# Patient Record
Sex: FEMALE | Race: WHITE
[De-identification: names, ages, dates, MRNs, and addresses within clinical notes are randomized per-mention and may not be internally consistent; named-entity substitution may affect disease eponyms.]

---

## 2018-04-16 ENCOUNTER — CLINICAL DOCUMENTATION (OUTPATIENT)
Dept: OTHER | Facility: CLINIC | Age: 58
End: 2018-04-16

## 2023-07-26 ENCOUNTER — TELEPHONE (OUTPATIENT)
Dept: FAMILY MEDICINE CLINIC | Age: 63
End: 2023-07-26

## 2023-07-26 DIAGNOSIS — Z13.228 SCREENING FOR METABOLIC DISORDER: Primary | ICD-10-CM

## 2023-07-26 NOTE — TELEPHONE ENCOUNTER
Patient has not been seen for a couple of years, made an appt with Betty for 8/3 @ 330 pm. Patient is requesting that orders for basic labs be done so that she can have them done prior to her visit with Betty and they can be discussed then.  Please advise if this can be done

## 2023-07-27 DIAGNOSIS — Z13.228 SCREENING FOR METABOLIC DISORDER: ICD-10-CM

## 2023-07-27 LAB
ABSOLUTE IMMATURE GRANULOCYTE: <0.03 K/UL (ref 0–0.58)
ALBUMIN SERPL-MCNC: 4.5 G/DL (ref 3.5–5.2)
ALP BLD-CCNC: 79 U/L (ref 35–104)
ALT SERPL-CCNC: 13 U/L (ref 0–32)
ANION GAP SERPL CALCULATED.3IONS-SCNC: 16 MMOL/L (ref 7–16)
AST SERPL-CCNC: 17 U/L (ref 0–31)
BASOPHILS ABSOLUTE: 0.05 K/UL (ref 0–0.2)
BASOPHILS RELATIVE PERCENT: 1 % (ref 0–2)
BILIRUB SERPL-MCNC: 0.5 MG/DL (ref 0–1.2)
BUN BLDV-MCNC: 15 MG/DL (ref 6–23)
CALCIUM SERPL-MCNC: 9.5 MG/DL (ref 8.6–10.2)
CHLORIDE BLD-SCNC: 105 MMOL/L (ref 98–107)
CHOLESTEROL: 267 MG/DL
CO2: 20 MMOL/L (ref 22–29)
CREAT SERPL-MCNC: 1 MG/DL (ref 0.5–1)
EOSINOPHILS ABSOLUTE: 0.3 K/UL (ref 0.05–0.5)
EOSINOPHILS RELATIVE PERCENT: 6 % (ref 0–6)
GFR SERPL CREATININE-BSD FRML MDRD: >60 ML/MIN/1.73M2
GLUCOSE BLD-MCNC: 77 MG/DL (ref 74–99)
HCT VFR BLD CALC: 43.1 % (ref 34–48)
HDLC SERPL-MCNC: 63 MG/DL
HEMOGLOBIN: 13.4 G/DL (ref 11.5–15.5)
IMMATURE GRANULOCYTES: 0 % (ref 0–5)
LDL CHOLESTEROL: 184 MG/DL
LYMPHOCYTES ABSOLUTE: 1.94 K/UL (ref 1.5–4)
LYMPHOCYTES RELATIVE PERCENT: 39 % (ref 20–42)
MCH RBC QN AUTO: 27.6 PG (ref 26–35)
MCHC RBC AUTO-ENTMCNC: 31.1 G/DL (ref 32–34.5)
MCV RBC AUTO: 88.9 FL (ref 80–99.9)
MONOCYTES ABSOLUTE: 0.44 K/UL (ref 0.1–0.95)
MONOCYTES RELATIVE PERCENT: 9 % (ref 2–12)
NEUTROPHILS ABSOLUTE: 2.25 K/UL (ref 1.8–7.3)
NEUTROPHILS RELATIVE PERCENT: 45 % (ref 43–80)
PDW BLD-RTO: 13.5 % (ref 11.5–15)
PLATELET # BLD: 188 K/UL (ref 130–450)
PMV BLD AUTO: 9.9 FL (ref 7–12)
POTASSIUM SERPL-SCNC: 4.2 MMOL/L (ref 3.5–5)
RBC # BLD: 4.85 M/UL (ref 3.5–5.5)
SODIUM BLD-SCNC: 141 MMOL/L (ref 132–146)
TOTAL PROTEIN: 7.1 G/DL (ref 6.4–8.3)
TRIGL SERPL-MCNC: 100 MG/DL
VLDLC SERPL CALC-MCNC: 20 MG/DL
WBC # BLD: 5 K/UL (ref 4.5–11.5)

## 2023-07-28 LAB
T4 FREE: 1.4 NG/DL (ref 0.9–1.7)
TSH SERPL DL<=0.05 MIU/L-ACNC: 0.73 UIU/ML (ref 0.27–4.2)

## 2023-08-01 SDOH — HEALTH STABILITY: PHYSICAL HEALTH: ON AVERAGE, HOW MANY DAYS PER WEEK DO YOU ENGAGE IN MODERATE TO STRENUOUS EXERCISE (LIKE A BRISK WALK)?: 2 DAYS

## 2023-08-03 ENCOUNTER — OFFICE VISIT (OUTPATIENT)
Dept: FAMILY MEDICINE CLINIC | Age: 63
End: 2023-08-03
Payer: COMMERCIAL

## 2023-08-03 VITALS
DIASTOLIC BLOOD PRESSURE: 80 MMHG | BODY MASS INDEX: 33.6 KG/M2 | WEIGHT: 182.6 LBS | HEIGHT: 62 IN | TEMPERATURE: 97.5 F | SYSTOLIC BLOOD PRESSURE: 132 MMHG | OXYGEN SATURATION: 98 % | HEART RATE: 80 BPM

## 2023-08-03 DIAGNOSIS — Z78.0 POST-MENOPAUSAL: ICD-10-CM

## 2023-08-03 DIAGNOSIS — Z13.228 SCREENING FOR METABOLIC DISORDER: ICD-10-CM

## 2023-08-03 DIAGNOSIS — E78.2 MIXED HYPERLIPIDEMIA: Primary | ICD-10-CM

## 2023-08-03 DIAGNOSIS — Z12.31 ENCOUNTER FOR SCREENING MAMMOGRAM FOR MALIGNANT NEOPLASM OF BREAST: ICD-10-CM

## 2023-08-03 PROCEDURE — 1090F PRES/ABSN URINE INCON ASSESS: CPT | Performed by: NURSE PRACTITIONER

## 2023-08-03 PROCEDURE — G8427 DOCREV CUR MEDS BY ELIG CLIN: HCPCS | Performed by: NURSE PRACTITIONER

## 2023-08-03 PROCEDURE — G8417 CALC BMI ABV UP PARAM F/U: HCPCS | Performed by: NURSE PRACTITIONER

## 2023-08-03 PROCEDURE — G8400 PT W/DXA NO RESULTS DOC: HCPCS | Performed by: NURSE PRACTITIONER

## 2023-08-03 PROCEDURE — 1123F ACP DISCUSS/DSCN MKR DOCD: CPT | Performed by: NURSE PRACTITIONER

## 2023-08-03 PROCEDURE — 3017F COLORECTAL CA SCREEN DOC REV: CPT | Performed by: NURSE PRACTITIONER

## 2023-08-03 PROCEDURE — 1036F TOBACCO NON-USER: CPT | Performed by: NURSE PRACTITIONER

## 2023-08-03 PROCEDURE — 99204 OFFICE O/P NEW MOD 45 MIN: CPT | Performed by: NURSE PRACTITIONER

## 2023-08-03 SDOH — ECONOMIC STABILITY: FOOD INSECURITY: WITHIN THE PAST 12 MONTHS, YOU WORRIED THAT YOUR FOOD WOULD RUN OUT BEFORE YOU GOT MONEY TO BUY MORE.: NEVER TRUE

## 2023-08-03 SDOH — ECONOMIC STABILITY: INCOME INSECURITY: HOW HARD IS IT FOR YOU TO PAY FOR THE VERY BASICS LIKE FOOD, HOUSING, MEDICAL CARE, AND HEATING?: NOT HARD AT ALL

## 2023-08-03 SDOH — ECONOMIC STABILITY: HOUSING INSECURITY
IN THE LAST 12 MONTHS, WAS THERE A TIME WHEN YOU DID NOT HAVE A STEADY PLACE TO SLEEP OR SLEPT IN A SHELTER (INCLUDING NOW)?: NO

## 2023-08-03 SDOH — ECONOMIC STABILITY: FOOD INSECURITY: WITHIN THE PAST 12 MONTHS, THE FOOD YOU BOUGHT JUST DIDN'T LAST AND YOU DIDN'T HAVE MONEY TO GET MORE.: NEVER TRUE

## 2023-08-03 ASSESSMENT — ENCOUNTER SYMPTOMS
EYE REDNESS: 0
CHEST TIGHTNESS: 0
SORE THROAT: 0
VOICE CHANGE: 0
WHEEZING: 0
EYE ITCHING: 0
STRIDOR: 0
PHOTOPHOBIA: 0
VOMITING: 0
BLOOD IN STOOL: 0
ABDOMINAL PAIN: 0
SINUS PAIN: 0
FACIAL SWELLING: 0
BACK PAIN: 0
TROUBLE SWALLOWING: 0
APNEA: 0
ANAL BLEEDING: 0
NAUSEA: 0
RHINORRHEA: 0
COUGH: 0
CONSTIPATION: 0
EYE DISCHARGE: 0
SHORTNESS OF BREATH: 0
CHOKING: 0
RECTAL PAIN: 0
COLOR CHANGE: 0
ABDOMINAL DISTENTION: 0
SINUS PRESSURE: 0
DIARRHEA: 0
EYE PAIN: 0

## 2023-08-03 ASSESSMENT — PATIENT HEALTH QUESTIONNAIRE - PHQ9
SUM OF ALL RESPONSES TO PHQ QUESTIONS 1-9: 0
2. FEELING DOWN, DEPRESSED OR HOPELESS: 0
SUM OF ALL RESPONSES TO PHQ QUESTIONS 1-9: 0
SUM OF ALL RESPONSES TO PHQ9 QUESTIONS 1 & 2: 0
1. LITTLE INTEREST OR PLEASURE IN DOING THINGS: 0
SUM OF ALL RESPONSES TO PHQ QUESTIONS 1-9: 0
SUM OF ALL RESPONSES TO PHQ QUESTIONS 1-9: 0

## 2023-08-03 NOTE — PROGRESS NOTES
k/uL Final    RBC 07/27/2023 4.85  3.50 - 5.50 m/uL Final    Hemoglobin 07/27/2023 13.4  11.5 - 15.5 g/dL Final    Hematocrit 07/27/2023 43.1  34.0 - 48.0 % Final    MCV 07/27/2023 88.9  80.0 - 99.9 fL Final    MCH 07/27/2023 27.6  26.0 - 35.0 pg Final    MCHC 07/27/2023 31.1 (L)  32.0 - 34.5 g/dL Final    RDW 07/27/2023 13.5  11.5 - 15.0 % Final    Platelets 25/20/8633 188  130 - 450 k/uL Final    MPV 07/27/2023 9.9  7.0 - 12.0 fL Final    Neutrophils % 07/27/2023 45  43.0 - 80.0 % Final    Lymphocytes % 07/27/2023 39  20.0 - 42.0 % Final    Monocytes % 07/27/2023 9  2.0 - 12.0 % Final    Eosinophils % 07/27/2023 6  0 - 6 % Final    Basophils % 07/27/2023 1  0.0 - 2.0 % Final    Immature Granulocytes 07/27/2023 0  0.0 - 5.0 % Final    Neutrophils Absolute 07/27/2023 2.25  1.80 - 7.30 k/uL Final    Lymphocytes Absolute 07/27/2023 1.94  1.50 - 4.00 k/uL Final    Monocytes Absolute 07/27/2023 0.44  0.10 - 0.95 k/uL Final    Eosinophils Absolute 07/27/2023 0.30  0.05 - 0.50 k/uL Final    Basophils Absolute 07/27/2023 0.05  0.00 - 0.20 k/uL Final    Absolute Immature Granulocyte 07/27/2023 <0.03  0.00 - 0.58 k/uL Final        Discussions/Education provided to patients during visit:  [] Discussed the importance to stop smoking. [x] Advised to monitor eating habits. [] Reviewed and discussed Imaging results. [x] Reviewed and discussed Lab results. [x] Discussed the importance of drinking plenty of fluids. [] Cut down on Salt and Caffeine. [x] Exercise 2-3 times weekly, if not more. [x] Cut down on Sugar and Fats. [x] Continue Medications as Discussed. [x] Communicated with patient any concerns, to phone office. [x] Follow up as directed. Return in about 6 months (around 2/3/2024).       Seen By:      SHANA Dejesus - CNP

## 2024-10-30 ENCOUNTER — CLINICAL DOCUMENTATION (OUTPATIENT)
Dept: GENERAL RADIOLOGY | Age: 64
End: 2024-10-30

## 2024-10-30 NOTE — PROGRESS NOTES
Patient viewed clinical report for mammogram on My Chart.  Report provided to Dr. Cadet, per patient request on Authorization to Release the results of a mammogram form.

## 2024-11-29 ENCOUNTER — TELEPHONE (OUTPATIENT)
Dept: PHARMACY | Facility: CLINIC | Age: 64
End: 2024-11-29

## 2024-11-29 NOTE — TELEPHONE ENCOUNTER
[facility] CLINICAL PHARMACY: ADHERENCE REVIEW  Identified care gap per [employer]: fills at [facility]  : Statin adherence      ASSESSMENT  STATIN ADHERENCE    Insurance Records claims through 2024 (Prior Year PDC = not reported; YTD PDC = 82%; Potential Fail Date: 2024):   ROSUVASTATIN TAB 5MG  last filled on 10/26/2024 for 30 day supply. Next refill due: 2024    Prescribed si tablet/capsule daily    Per Insurer Portal: last filled on 2024 for 30 day supply.     Per [facility]: last picked up on 2024 for 30 day supply.    Lab Results   Component Value Date    CHOL 267 (H) 2023    TRIG 100 2023    HDL 63 2023     Lab Results   Component Value Date     (H) 2023      ALT   Date Value Ref Range Status   2023 13 0 - 32 U/L Final     AST   Date Value Ref Range Status   2023 17 0 - 31 U/L Final     The ASCVD Risk score (Carol DK, et al., 2019) failed to calculate for the following reasons:    The systolic blood pressure is missing     PLAN  Per insurer report, LIS-0 - co-pays are based on tiers and patient is subject to coverage gap.      The following are interventions that have been identified:   Patient picked up 30 day supply of ROSUVASTATIN TAB 5MG  on 2024    No patient outreach planned at this time.      Last Visit: 2023  Next Visit: None      Priti Tan MA  [facility]   [facility]  [phone] Option 1     For Pharmacy Admin Tracking Only    Program: [employer]  CPA in place:  No  Gap Closed?: Yes   Time Spent (min): 20

## 2025-03-21 ENCOUNTER — TELEPHONE (OUTPATIENT)
Dept: PHARMACY | Facility: CLINIC | Age: 65
End: 2025-03-21

## 2025-03-21 NOTE — TELEPHONE ENCOUNTER
[facility] CLINICAL PHARMACY: ADHERENCE REVIEW  Identified care gap per [employer]: fills at [facility]  : ACE/ARB and Statin adherence    Patient also appears to be prescribed: ACE/ARB and Statin    ASSESSMENT    ACE/ARB ADHERENCE    Insurance Records claims through 2025 (Prior Year PDC = not reported; YTD PDC = FIRST FILL; Potential Fail Date: 25):   LISINOPRIL TAB 5MG last filled on 25 for 30 day supply. Next refill due: 04/10/25    Prescribed si tablet/capsule daily    Per Reconcile Dispense History: last filled on 25 for 30 day supply.     Per [facility]: 2 refills remaining    BP Readings from Last 3 Encounters:   23 132/80     CrCl cannot be calculated (Patient's most recent lab result is older than the maximum 180 days allowed.).  Lab Results   Component Value Date    CREATININE 1.0 2023     Lab Results   Component Value Date    K 4.2 2023     STATIN ADHERENCE    Insurance Records claims through 2025 (Prior Year PDC = not reported; YTD PDC = FIRST FILL; Potential Fail Date: 25):   ROSUVASTATIN TAB 5MG last filled on 25 for 30 day supply. Next refill due: 25    Prescribed si tablet/capsule daily    Per Reconcile Dispense History: last filled on 25 for 30 day supply.     Per [facility]:  5 refills remaining. will get  90 day supply ready to  since past due.    Lab Results   Component Value Date    CHOL 267 (H) 2023    TRIG 100 2023    HDL 63 2023     Lab Results   Component Value Date     (H) 2023      ALT   Date Value Ref Range Status   2023 13 0 - 32 U/L Final     AST   Date Value Ref Range Status   2023 17 0 - 31 U/L Final     The ASCVD Risk score (Carol DK, et al., 2019) failed to calculate for the following reasons:    The systolic blood pressure is missing     PLAN  The following are interventions that have been identified:

## 2025-03-24 NOTE — TELEPHONE ENCOUNTER
Noted, per payer portal last rosuvastatin claim 3/21/25 x 90, lisinopril 3/11/25 x 30 at [facility], Rxer KEITH CADET - appears to be with [facility]. Pt's last visit with Betty Lopez APRN - CNP was 8/3/2023. Attributed provider per [facility] Betty Lopez APRN - CNP - if appropriate pt needs to call [facility] and update her PCP, or likely schedule follow up with Betty Lopez APRN - CNP for refills.    Of note she is eligible for 100 day supply of medication for $0 copay per [facility] PreCheck.    Spoke with patient regarding above. Pt confirms she is seeing Dr. Cadet with [facility], no longer seeing Betty Lopez APRN - CNP. Encouraged her to contact [facility] to update PCP. Also offered to fax Dr. Cadet for her, she said she would call. Also discussed 100 day supply if she prefers. Pt expressed appreciation for information and states she will follow up as advised.    For Pharmacy Admin Tracking Only    Program: [employer]  CPA in place:  No  Recommendation Provided To: Patient/Caregiver: 2 via Telephone and Pharmacy: 1  Intervention Detail: Adherence Monitorin and New Rx: 2, reason: Improve Adherence  Intervention Accepted By: Patient/Caregiver: 2 and Pharmacy: 1  Gap Closed?: Yes   Time Spent (min): 30